# Patient Record
Sex: MALE | Race: WHITE | NOT HISPANIC OR LATINO
[De-identification: names, ages, dates, MRNs, and addresses within clinical notes are randomized per-mention and may not be internally consistent; named-entity substitution may affect disease eponyms.]

---

## 2022-04-20 ENCOUNTER — TRANSCRIPTION ENCOUNTER (OUTPATIENT)
Age: 32
End: 2022-04-20

## 2022-04-20 PROBLEM — Z00.00 ENCOUNTER FOR PREVENTIVE HEALTH EXAMINATION: Status: ACTIVE | Noted: 2022-04-20

## 2022-05-02 ENCOUNTER — APPOINTMENT (OUTPATIENT)
Dept: ORTHOPEDIC SURGERY | Facility: CLINIC | Age: 32
End: 2022-05-02
Payer: OTHER MISCELLANEOUS

## 2022-05-02 DIAGNOSIS — Z78.9 OTHER SPECIFIED HEALTH STATUS: ICD-10-CM

## 2022-05-02 DIAGNOSIS — M22.2X1 PATELLOFEMORAL DISORDERS, RIGHT KNEE: ICD-10-CM

## 2022-05-02 DIAGNOSIS — S83.271A COMPLEX TEAR OF LATERAL MENISCUS, CURRENT INJURY, RIGHT KNEE, INITIAL ENCOUNTER: ICD-10-CM

## 2022-05-02 DIAGNOSIS — M76.51 PATELLAR TENDINITIS, RIGHT KNEE: ICD-10-CM

## 2022-05-02 PROCEDURE — 99214 OFFICE O/P EST MOD 30 MIN: CPT

## 2022-05-02 PROCEDURE — 99072 ADDL SUPL MATRL&STAF TM PHE: CPT

## 2022-05-03 PROBLEM — M76.51 PATELLAR TENDINITIS OF RIGHT KNEE: Status: ACTIVE | Noted: 2022-05-02

## 2022-05-03 PROBLEM — S83.271A COMPLEX TEAR OF LATERAL MENISCUS OF RIGHT KNEE AS CURRENT INJURY, INITIAL ENCOUNTER: Status: ACTIVE | Noted: 2022-05-02

## 2022-05-03 PROBLEM — M22.2X1 PATELLOFEMORAL PAIN SYNDROME OF RIGHT KNEE: Status: ACTIVE | Noted: 2022-05-02

## 2022-05-03 NOTE — DATA REVIEWED
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

## 2022-05-03 NOTE — HISTORY OF PRESENT ILLNESS
[de-identified] : The patient is a 30 year old right hand dominant male who presents today for right knee follow up and MRI results\par Date of Injury/Onset:  DOI: 7/25/19\par Pain: At Rest: 4/10 \par With Activity: 6/10 (discomfort)\par Mechanism of injury: patient was at work when he was pulled down a flight of stairs. he ended up breaking his tibia\par they did an MRI and was told he had a torn meniscus. Pt had surgery on 5/5/21.\par This is a Work Related Injury being treated under Worker's Compensation.\par This is NOT an athletic injury occurring associated with an interscholastic or organized sports team.\par Quality of symptoms: Grinding, clicking, aches, soreness\par Improves with: Rest\par Worse with: prolonged walking, standing. \par Treatment/Imaging/Studies Since Last Visit: MRI\par Reports Available For Review Today: MRI report\par Out of work/sport: back to work light duty, desk work\par School/Sport/Position/Occupation: [employer] \par Changes since last visit: Pain has intensified. Grinding/clicking is constant w/ movement. Pt will c/o dull ache/discomfort\par after a day of walking. \par Additional Information: None

## 2022-05-03 NOTE — IMAGING
[de-identified] : The patient is a well appearing 31 year old male of their stated age.\par \par Surgical site: right knee\par \par Incision sites: Well healed\par \par Range of motion: -5-145, crepitus with flexion and extension\par \par Motor Testin-/5 quad \par \par Stability Testing: Stable ligamentous exam\par \par Swelling/Effusion: None\par \par Tenderness to palpation: anteromedial and anterolateral joint line tenderness \par \par Provocative testing: Negative\par \par Right Calf: soft and nontender\par Left Calf: soft and nontender\par \par Neurovascular Examination: Grossly intact, 2+ distal pulses\par \par \par Assessment and Plan: The patient is approximately 12 months s/p Right knee arthroscopic partial lateral meniscectomy, chondroplasty lateral tibial plateau (DOS 21) with secondary patellar tendonitis. The patient's post-op plan, protocol and activity modifications have been thoroughly discussed and the patient expressed understanding. Continue with HEP and ice. The patient otherwise may advance activity as discussed. 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 \par F/u prn for [facility]. \par \par \par Documents/Results Reviewed Today: MRI right knee\par Tests/Studies Independently Interpreted Today: MRI right knee reveals evidence of prior partial lateral meniscectomy and chondromalacia patella\par \par \par The patient's current medication management of their orthopedic diagnosis was reviewed today:\par (1) We discussed a comprehensive treatment plan that included possible pharmaceutical management involving the use of prescription strength medications including but not limited to options such as oral Naprosyn 500mg BID, once daily Meloxicam 15 mg, or 500-650 mg Tylenol versus over the counter oral medications and topical prescription NSAID Pennsaid vs over the counter Voltaren gel.\par (2) There is a moderate risk of morbidity with further treatment, especially from use of prescription strength medications and possible side effects of these medications which include upset stomach with oral medications, skin reactions to topical medications and cardiac/renal issues with long term use.\par (3) I recommended that the patient follow-up with their medical physician to discuss any significant specific potential issues with long term medication use such as interactions with current medications or with exacerbation of underlying medical comorbidities.\par (4) The benefits and risks associated with use of injectable, oral or topical, prescription and over the counter anti-inflammatory medications were discussed with the patient. The patient voiced understanding of the risks including but not limited to bleeding, stroke, kidney dysfunction, heart disease, and were referred to the black box warning label for further information.\par \par I, Stevenson Kenney, attest that this documentation has been prepared under the direction and in the presence of Provider Dr. Rowe\par \par The documentation recorded by the scribe accurately reflects the service I personally performed and the decisions made by me.\par

## 2022-08-21 ENCOUNTER — RESULT REVIEW (OUTPATIENT)
Age: 32
End: 2022-08-21